# Patient Record
Sex: FEMALE | Race: WHITE | Employment: FULL TIME | URBAN - METROPOLITAN AREA
[De-identification: names, ages, dates, MRNs, and addresses within clinical notes are randomized per-mention and may not be internally consistent; named-entity substitution may affect disease eponyms.]

---

## 2017-01-11 ENCOUNTER — HOSPITAL ENCOUNTER (OUTPATIENT)
Dept: MAMMOGRAPHY | Age: 47
Discharge: HOME | End: 2017-01-11
Attending: ADVANCED PRACTICE MIDWIFE
Payer: COMMERCIAL

## 2017-01-11 DIAGNOSIS — Z12.31 VISIT FOR SCREENING MAMMOGRAM: ICD-10-CM

## 2017-01-11 PROCEDURE — 77067 SCR MAMMO BI INCL CAD: CPT

## 2017-09-18 ENCOUNTER — TELEPHONE (OUTPATIENT)
Dept: OBGYN CLINIC | Age: 47
End: 2017-09-18

## 2017-09-19 DIAGNOSIS — Z30.09 FAMILY PLANNING: Primary | ICD-10-CM

## 2017-09-19 RX ORDER — NORETHINDRONE ACETATE AND ETHINYL ESTRADIOL 1MG-20(21)
1 KIT ORAL DAILY
Qty: 28 TAB | Refills: 2 | Status: SHIPPED | OUTPATIENT
Start: 2017-09-19 | End: 2017-11-27 | Stop reason: ALTCHOICE

## 2017-09-26 NOTE — TELEPHONE ENCOUNTER
Pt called pharmacy said they didn't receive her rx. See that it is confirmed in connect care that they received it. Told pt that we would look into it and give her a call since she is 3 days late taking it.

## 2017-09-27 NOTE — TELEPHONE ENCOUNTER
Returned patients call, she was not available to answer. Left a voicemail message for her to return my call.

## 2017-11-27 ENCOUNTER — OFFICE VISIT (OUTPATIENT)
Dept: OBGYN CLINIC | Age: 47
End: 2017-11-27

## 2017-11-27 VITALS
SYSTOLIC BLOOD PRESSURE: 127 MMHG | HEIGHT: 65 IN | HEART RATE: 118 BPM | WEIGHT: 133 LBS | DIASTOLIC BLOOD PRESSURE: 76 MMHG | BODY MASS INDEX: 22.16 KG/M2

## 2017-11-27 DIAGNOSIS — Z32.02 URINE PREGNANCY TEST NEGATIVE: ICD-10-CM

## 2017-11-27 DIAGNOSIS — Z01.419 WELL WOMAN EXAM WITH ROUTINE GYNECOLOGICAL EXAM: Primary | ICD-10-CM

## 2017-11-27 DIAGNOSIS — Z30.09 FAMILY PLANNING: ICD-10-CM

## 2017-11-27 RX ORDER — ACETAMINOPHEN AND CODEINE PHOSPHATE 120; 12 MG/5ML; MG/5ML
1 SOLUTION ORAL DAILY
Qty: 1 PACKAGE | Refills: 11 | Status: SHIPPED | OUTPATIENT
Start: 2017-11-27 | End: 2018-01-12 | Stop reason: ALTCHOICE

## 2017-11-27 RX ORDER — NORTRIPTYLINE HYDROCHLORIDE 50 MG/1
100 CAPSULE ORAL
Refills: 0 | COMMUNITY
Start: 2017-10-31

## 2017-11-27 RX ORDER — OMEPRAZOLE 40 MG/1
CAPSULE, DELAYED RELEASE ORAL
Refills: 0 | COMMUNITY
Start: 2017-11-21

## 2017-11-27 RX ORDER — LEVOTHYROXINE SODIUM 75 UG/1
TABLET ORAL
Refills: 0 | COMMUNITY
Start: 2017-10-31

## 2017-11-27 NOTE — PROGRESS NOTES
Subjective:   41 y.o. female for Well Woman Check. Patient's last menstrual period was 11/21/2017. Social History: single partner, contraception - OCP (Oral Contraceptive Pills). Pertinent past medical hstory: migraines. There is no problem list on file for this patient. Current Outpatient Prescriptions   Medication Sig Dispense Refill    levothyroxine (SYNTHROID) 75 mcg tablet   0    nortriptyline (PAMELOR) 50 mg capsule 100 mg nightly. 0    omeprazole (PRILOSEC) 40 mg capsule   0    norethindrone (MICRONOR) 0.35 mg tab Take 1 Tab by mouth daily. 1 Package 11    topiramate (TOPAMAX) 100 mg tablet Take 200 mg by mouth nightly.  acyclovir (ZOVIRAX) 5 % ointment Apply  to affected area every three (3) hours. 2 g 1    nitrofurantoin, macrocrystal-monohydrate, (MACROBID) 100 mg capsule Take 1 Cap by mouth two (2) times a day. 20 Cap 5    zolpidem (AMBIEN) 10 mg tablet Take  by mouth nightly as needed. No Known Allergies  Past Medical History:   Diagnosis Date    Hx of migraines      History reviewed. No pertinent surgical history. Family History   Problem Relation Age of Onset    Breast Cancer Maternal Aunt 61    No Known Problems Mother     No Known Problems Father      Social History   Substance Use Topics    Smoking status: Never Smoker    Smokeless tobacco: Never Used    Alcohol use No        ROS:  Feeling well. No dyspnea or chest pain on exertion. No abdominal pain, change in bowel habits, black or bloody stools. No urinary tract symptoms. GYN ROS: normal menses, no abnormal bleeding, pelvic pain or discharge, no breast pain or new or enlarging lumps on self exam. No neurological complaints. Objective:     Visit Vitals    /76 (BP 1 Location: Left arm, BP Patient Position: Sitting)    Pulse (!) 118    Ht 5' 5\" (1.651 m)    Wt 133 lb (60.3 kg)    LMP 11/21/2017    BMI 22.13 kg/m2     The patient appears well, alert, oriented x 3, in no distress.   ENT normal. Neck supple. No adenopathy or thyromegaly. DAHIANA. Lungs are clear, good air entry, no wheezes, rhonchi or rales. S1 and S2 normal, no murmurs, regular rate and rhythm. Abdomen soft without tenderness, guarding, mass or organomegaly. Extremities show no edema, normal peripheral pulses. Neurological is normal, no focal findings. BREAST EXAM: breasts appear normal, no suspicious masses, no skin or nipple changes or axillary nodes    PELVIC EXAM: VULVA: normal appearing vulva with no masses, tenderness or lesions, VAGINA: normal appearing vagina with normal color and discharge, no lesions, CERVIX: normal appearing cervix without discharge or lesions, cervical motion tenderness absent, UTERUS: uterus is normal size, shape, consistency and nontender, ADNEXA: normal adnexa in size, nontender and no masses, PAP: Pap smear done today, thin-prep method, Pap smear schedule reviewed with patient, recent Pap result reviewed with patient, HPV test    Assessment/Plan:   well woman  mammogram  pap smear  counseled on breast self exam, mammography screening, STD prevention, HIV risk factors and prevention, use and side effects of OCP's, family planning choices, menopause and adequate intake of calcium and vitamin D  return annually or prn    ICD-10-CM ICD-9-CM    1. Well woman exam with routine gynecological exam Z01.419 V72.31 PAP (IMAGE GUIDED) + HPV HIGH RISK   2. Family planning Z30.09 V25.09 norethindrone (MICRONOR) 0.35 mg tab      AMB POC URINE PREGNANCY TEST, VISUAL COLOR COMPARISON   3.  Urine pregnancy test negative Z32.02 V72.41    .

## 2017-11-27 NOTE — MR AVS SNAPSHOT
Visit Information Date & Time Provider Department Dept. Phone Encounter #  
 11/27/2017  3:30 PM [facility] [place] [phone] [id number] Follow-up Instructions Return in about 1 year (around 11/27/2018), or if symptoms worsen or fail to improve. Upcoming Health Maintenance Date Due Influenza Age 5 to Adult 8/1/2017 PAP AKA CERVICAL CYTOLOGY 11/11/2019 Allergies as of 11/27/2017  Review Complete On: 11/27/2017 By: Suman Thomas CNM No Known Allergies Current Immunizations  Never Reviewed No immunizations on file. Not reviewed this visit You Were Diagnosed With   
  
 Codes Comments Well woman exam with routine gynecological exam    -  Primary ICD-10-CM: X20.099 ICD-9-CM: V72.31 Family planning     ICD-10-CM: Z30.09 
ICD-9-CM: V25.09 Vitals BP Pulse Height(growth percentile) Weight(growth percentile) LMP BMI  
 127/76 (BP 1 Location: Left arm, BP Patient Position: Sitting) (!) 118 5' 5\" (1.651 m) 133 lb (60.3 kg) 11/21/2017 22.13 kg/m2 OB Status Smoking Status Having regular periods Never Smoker BMI and BSA Data Body Mass Index Body Surface Area  
 22.13 kg/m 2 1.66 m 2 Preferred Pharmacy Pharmacy Name Phone [address], [address] [place] [address] [phone] Your Updated Medication List  
  
   
This list is accurate as of: 11/27/17  3:50 PM.  Always use your most recent med list.  
  
  
  
  
 acyclovir 5 % ointment Commonly known as:  ZOVIRAX Apply  to affected area every three (3) hours. AMBIEN 10 mg tablet Generic drug:  zolpidem Take  by mouth nightly as needed. levothyroxine 75 mcg tablet Commonly known as:  SYNTHROID  
  
 nitrofurantoin (macrocrystal-monohydrate) 100 mg capsule Commonly known as:  MACROBID Take 1 Cap by mouth two (2) times a day. norethindrone 0.35 mg Tab Commonly known as:  Maurilio & Maurilio Take 1 Tab by mouth daily. nortriptyline 50 mg capsule Commonly known as:  PAMELOR  
100 mg nightly. omeprazole 40 mg capsule Commonly known as:  PRILOSEC  
  
 TOPAMAX 100 mg tablet Generic drug:  topiramate Take 200 mg by mouth nightly. Prescriptions Sent to Pharmacy Refills  
 norethindrone (MICRONOR) 0.35 mg tab 11 Sig: Take 1 Tab by mouth daily. Class: Normal  
 Pharmacy: [facility], [address] [place] [address] #: [phone] Route: Oral  
  
Follow-up Instructions Return in about 1 year (around 11/27/2018), or if symptoms worsen or fail to improve. Patient Instructions Well Visit, Ages 25 to 48: Care Instructions Your Care Instructions Physical exams can help you stay healthy. Your doctor has checked your overall health and may have suggested ways to take good care of yourself. He or she also may have recommended tests. At home, you can help prevent illness with healthy eating, regular exercise, and other steps. Follow-up care is a key part of your treatment and safety. Be sure to make and go to all appointments, and call your doctor if you are having problems. It's also a good idea to know your test results and keep a list of the medicines you take. How can you care for yourself at home? · Reach and stay at a healthy weight. This will lower your risk for many problems, such as obesity, diabetes, heart disease, and high blood pressure. · Get at least 30 minutes of physical activity on most days of the week. Walking is a good choice. You also may want to do other activities, such as running, swimming, cycling, or playing tennis or team sports. Discuss any changes in your exercise program with your doctor. · Do not smoke or allow others to smoke around you. If you need help quitting, talk to your doctor about stop-smoking programs and medicines. These can increase your chances of quitting for good. · Talk to your doctor about whether you have any risk factors for sexually transmitted infections (STIs). Having one sex partner (who does not have STIs and does not have sex with anyone else) is a good way to avoid these infections. · Use birth control if you do not want to have children at this time. Talk with your doctor about the choices available and what might be best for you. · Protect your skin from too much sun. When you're outdoors from 10 a.m. to 4 p.m., stay in the shade or cover up with clothing and a hat with a wide brim. Wear sunglasses that block UV rays. Even when it's cloudy, put broad-spectrum sunscreen (SPF 30 or higher) on any exposed skin. · See a dentist one or two times a year for checkups and to have your teeth cleaned. · Wear a seat belt in the car. · Drink alcohol in moderation, if at all. That means no more than 2 drinks a day for men and 1 drink a day for women. Follow your doctor's advice about when to have certain tests. These tests can spot problems early. For everyone · Cholesterol. Have the fat (cholesterol) in your blood tested after age 21. Your doctor will tell you how often to have this done based on your age, family history, or other things that can increase your risk for heart disease. · Blood pressure. Have your blood pressure checked during a routine doctor visit. Your doctor will tell you how often to check your blood pressure based on your age, your blood pressure results, and other factors. · Vision. Talk with your doctor about how often to have a glaucoma test. 
· Diabetes. Ask your doctor whether you should have tests for diabetes. · Colon cancer. Have a test for colon cancer at age 48. You may have one of several tests. If you are younger than 48, you may need a test earlier if you have any risk factors.  Risk factors include whether you already had a precancerous polyp removed from your colon or whether your parent, brother, sister, or child has had colon cancer. For women · Breast exam and mammogram. Talk to your doctor about when you should have a clinical breast exam and a mammogram. Medical experts differ on whether and how often women under 50 should have these tests. Your doctor can help you decide what is right for you. · Pap test and pelvic exam. Begin Pap tests at age 24. A Pap test is the best way to find cervical cancer. The test often is part of a pelvic exam. Ask how often to have this test. 
· Tests for sexually transmitted infections (STIs). Ask whether you should have tests for STIs. You may be at risk if you have sex with more than one person, especially if your partners do not wear condoms. For men · Tests for sexually transmitted infections (STIs). Ask whether you should have tests for STIs. You may be at risk if you have sex with more than one person, especially if you do not wear a condom. · Testicular cancer exam. Ask your doctor whether you should check your testicles regularly. · Prostate exam. Talk to your doctor about whether you should have a blood test (called a PSA test) for prostate cancer. Experts differ on whether and when men should have this test. Some experts suggest it if you are older than 39 and are -American or have a father or brother who got prostate cancer when he was younger than 72. When should you call for help? Watch closely for changes in your health, and be sure to contact your doctor if you have any problems or symptoms that concern you. Where can you learn more? Go to http://chandler-consuelo.info/. Enter P072 in the search box to learn more about \"Well Visit, Ages 25 to 48: Care Instructions. \" Current as of: May 12, 2017 Content Version: 11.4 © 5099-5782 Healthwise, Incorporated.  Care instructions adapted under license by [address] (which disclaims liability or warranty for this information). If you have questions about a medical condition or this instruction, always ask your healthcare professional. [address] any warranty or liability for your use of this information. Introducing [facility]! [employer] introduces [employer] patient portal. Now you can access parts of your medical record, email your doctor's office, and request medication refills online. 1. In your internet browser, go to [email or web address] 2. Click on the First Time User? Click Here link in the Sign In box. You will see the New Member Sign Up page. 3. Enter your [employer] Access Code exactly as it appears below. You will not need to use this code after youve completed the sign-up process. If you do not sign up before the expiration date, you must request a new code. · [employer] Access Code: [email or web address] Expires: 2/25/2018  3:50 PM 
 
4. Enter the last four digits of your Social Security Number (xxxx) and Date of Birth (mm/dd/yyyy) as indicated and click Submit. You will be taken to the next sign-up page. 5. Create a [employer] ID. This will be your [employer] login ID and cannot be changed, so think of one that is secure and easy to remember. 6. Create a [employer] password. You can change your password at any time. 7. Enter your Password Reset Question and Answer. This can be used at a later time if you forget your password. 8. Enter your e-mail address. You will receive e-mail notification when new information is available in [address] 9. Click Sign Up. You can now view and download portions of your medical record. 10. Click the Download Summary menu link to download a portable copy of your medical information. If you have questions, please visit the Frequently Asked Questions section of the [employer] website.  Remember, [employer] is NOT to be used for urgent needs. For medical emergencies, dial 911. Now available from your iPhone and Android! Please provide this summary of care documentation to your next provider. Your primary care clinician is listed as Angle Bermudez. If you have any questions after today's visit, please call [phone].

## 2017-11-27 NOTE — PATIENT INSTRUCTIONS

## 2017-11-29 LAB
HPV HIGH RISK, 507303: POSITIVE
PAP IMAGE GUIDED, 8900296: ABNORMAL

## 2017-12-12 LAB
HPV GENOTYPE, 16, 507501: POSITIVE
HPV GENOTYPE, 18, 507502: NEGATIVE

## 2017-12-21 ENCOUNTER — TELEPHONE (OUTPATIENT)
Dept: OBGYN CLINIC | Age: 47
End: 2017-12-21

## 2017-12-21 DIAGNOSIS — B37.31 VAGINAL YEAST INFECTION: Primary | ICD-10-CM

## 2017-12-21 RX ORDER — FLUCONAZOLE 150 MG/1
150 TABLET ORAL DAILY
Qty: 1 TAB | Refills: 0 | Status: SHIPPED | OUTPATIENT
Start: 2017-12-21 | End: 2017-12-22

## 2017-12-21 NOTE — TELEPHONE ENCOUNTER
Spoke with patient using appropriate patient identifiers. Reviewed HPV + results and recommendation for colpo. She verbalizes understanding. Also requests rx for Diflucan after finishing Amoxicillin 500mg for 1 week. Currently has vaginal itching. Will escribe medication to pharmacy and transferred to scheduling for colpo appointment.

## 2018-01-12 ENCOUNTER — OFFICE VISIT (OUTPATIENT)
Dept: OBGYN CLINIC | Age: 48
End: 2018-01-12

## 2018-01-12 VITALS
WEIGHT: 136 LBS | HEART RATE: 90 BPM | DIASTOLIC BLOOD PRESSURE: 71 MMHG | HEIGHT: 65 IN | RESPIRATION RATE: 16 BRPM | OXYGEN SATURATION: 99 % | SYSTOLIC BLOOD PRESSURE: 111 MMHG | TEMPERATURE: 97.9 F | BODY MASS INDEX: 22.66 KG/M2

## 2018-01-12 DIAGNOSIS — Z76.89 VISIT FOR BIOPSY: ICD-10-CM

## 2018-01-12 DIAGNOSIS — R87.810 ASCUS WITH POSITIVE HIGH RISK HPV CERVICAL: Primary | ICD-10-CM

## 2018-01-12 DIAGNOSIS — N92.1 BREAKTHROUGH BLEEDING ON BIRTH CONTROL PILLS: ICD-10-CM

## 2018-01-12 DIAGNOSIS — R87.610 ASCUS WITH POSITIVE HIGH RISK HPV CERVICAL: Primary | ICD-10-CM

## 2018-01-12 LAB
HCG URINE, QL. (POC): NEGATIVE
VALID INTERNAL CONTROL?: YES

## 2018-01-12 NOTE — PROGRESS NOTES
SUBJ: Ms. Nick Don is a 57 y.o. y/o female, , who presents today for     Chief Complaint   Patient presents with    Colposcopy    Menstrual Problem       Her LMP was Patient's last menstrual period was 2018. Violette presents for:    1. Colpo - ASCUS with HPV 16 on . Counseled on need for follow up colpo. Previous Pap was also ASCUS with +HR HPV, colpo report was normal.     2. Irregular bleeding - Patient was placed on POP at last office visit after long standing hx on RIK. Since taking POP, she experiences irregular bleeding. Now has 2-3 menstrual cycles per month. Bleeding now lasts longer than her normal 3 days - lasts 4-5 days. Patient notes bleeding is heavier than previously. Denies dysmenorrhea. No other  concerns, denies SOB, H/A or CP. Desires to change back to combined oral contraceptive. Past Medical History:   Diagnosis Date    Hx of migraines       History reviewed. No pertinent surgical history. Family History   Problem Relation Age of Onset    Breast Cancer Maternal Aunt 61    No Known Problems Mother     No Known Problems Father      Social History     Social History    Marital status:      Spouse name: N/A    Number of children: N/A    Years of education: N/A     Social History Main Topics    Smoking status: Never Smoker    Smokeless tobacco: Never Used    Alcohol use No    Drug use: No    Sexual activity: Yes     Partners: Male     Other Topics Concern    None     Social History Narrative         PE:   Visit Vitals    /71    Pulse 90    Temp 97.9 °F (36.6 °C) (Oral)    Resp 16    Ht 5' 5\" (1.651 m)    Wt 136 lb (61.7 kg)    LMP 2018    SpO2 99%    BMI 22.63 kg/m2       Pt is a well developed female who is alert and oriented x 3. CVS exam: normal rate, regular rhythm, normal S1, S2, no murmurs, rubs, clicks or gallops.   Lungs: clear bilaterally to auscultation, no wheezing, rhonci or rales    Colposcopy Procedure Note    Patient: Raymond Hurley  MRN: [id number]  Date: 1/12/2018     Age:  57 y.o.,      Sex: female    YOB: 1970    Raymond Hurley is seen today for colposcopy. Indications:   Most recent Pap smear dated 11/27/17. Result: ASCUS HR HPV+. The prior Pap result: ASCUS HR HPV+  Prior cervical/vaginal disease: None. Prior cervical treatment: No treatment. Procedure Details   The risks and benefits of the procedure and written informed consent obtained. Speculum placed in vagina and excellent visualization of cervix achieved, cervix swabbed x 3 with acetic acid solution. Findings:  Cervix: mosaicism noted at 4, 12, and 8 o'clock; endocervical curettage performed, cervical biopsies taken at 4, 12, and 8 o'clock, specimen labelled and sent to pathology and hemostasis achieved with Monsel's solution. Vaginal inspection: vaginal colposcopy not performed. Vulvar colposcopy: vulvar colposcopy not performed. Specimens: Ectocervix at 4, 12, and 8 o'clock, ECC  Complications: none. Kala Sheridan CNM  January 12, 2018      Assessment/Plan:     1. Visit for biopsy  Urine pregnancy test negative  - AMB POC URINE PREGNANCY TEST, VISUAL COLOR COMPARISON    2. Breakthrough bleeding on birth control pills  Reviewed R/B/A of RIK and risk of cardiovascular or thrombotic event due   Reviewed s/s of CVA or MI and when to d/c RIK and visit ED  She verbalizes understanding and acknowledges risk  Desires to resume RIK despite risk  - norethindrone-e.estradiol-iron (LO LOESTRIN FE) 1 mg-10 mcg (24)/10 mcg (2) tab; Take 1 Tab by mouth daily. Dispense: 1 Package; Refill: 10    3. ASCUS with positive high risk HPV cervical  Specimens labelled and sent to Pathology. Will base further treatment on Pathology findings. Post biopsy instructions given to patient.   - SURGICAL PATHOLOGY; Future  RTC for 2 weeks for f/u

## 2018-01-12 NOTE — PATIENT INSTRUCTIONS
Colposcopy: What to Expect at [address] may feel some soreness in your vagina for a day or two if you had a biopsy. Some vaginal bleeding or discharge is normal for up to a week after a biopsy. The discharge may be dark-colored if a solution was put on your cervix. You can use a sanitary pad for the bleeding. It may take a week or two for you to get the test results. This care sheet gives you a general idea about how long it will take for you to recover. But each person recovers at a different pace. Follow the steps below to feel better as quickly as possible. How can you care for yourself at home? Activity  ? · You can return to work and most daily activities right after the test.   Exercise  ? · Do not exercise for 1 day after the test.   Medicines  ? · Your doctor will tell you if and when you can restart your medicines. He or she will also give you instructions about taking any new medicines. ? · If you take blood thinners, such as warfarin (Coumadin), clopidogrel (Plavix), or aspirin, be sure to talk to your doctor. He or she will tell you if and when to start taking those medicines again. Make sure that you understand exactly what your doctor wants you to do. ? · Take an over-the-counter pain medicine, such as acetaminophen (Tylenol), ibuprofen (Advil, Motrin), or naproxen (Aleve). Be safe with medicines. Read and follow all instructions on the label. Do not take two or more pain medicines at the same time unless the doctor told you to. Many pain medicines have acetaminophen, which is Tylenol. Too much acetaminophen (Tylenol) can be harmful. Other instructions  ? · Use a pad if you have some bleeding. ? · Do not douche, have sexual intercourse, or use tampons for 1 week if you had a biopsy. This will allow time for your cervix to heal.   ? · You can take a bath or shower anytime after the test.   Follow-up care is a key part of your treatment and safety.  Be sure to make and go Spoke with pt , test results given   to all appointments, and call your doctor if you are having problems. It's also a good idea to know your test results and keep a list of the medicines you take. When should you call for help? Call your doctor now or seek immediate medical care if:  ? · You have severe vaginal bleeding. This means that you are soaking through your usual pads or tampons each hour for 2 or more hours. ? · You have pain that does not get better after you take pain medicine. ? · You have signs of infection, such as:  ¨ Increased pain. ¨ Bad-smelling vaginal discharge. ¨ A fever. ? Watch closely for any changes in your health, and be sure to contact your doctor if:  ? · You have questions or concerns. Where can you learn more? Go to http://chandler-consuelo.info/. Enter M523 in the search box to learn more about \"Colposcopy: What to Expect at Home. \"  Current as of: May 12, 2017  Content Version: 11.4  © 5801-3533 Healthwise, Incorporated. Care instructions adapted under license by [employer] (which disclaims liability or warranty for this information). If you have questions about a medical condition or this instruction, always ask your healthcare professional. [address] any warranty or liability for your use of this information.

## 2018-01-12 NOTE — MR AVS SNAPSHOT
Visit Information Date & Time Provider Department Dept. Phone Encounter #  
 1/12/2018 11:00 AM [address], [address] [place] [address] [id number] Upcoming Health Maintenance Date Due Influenza Age 5 to Adult 8/1/2017 PAP AKA CERVICAL CYTOLOGY 11/27/2020 Allergies as of 1/12/2018  Review Complete On: 1/12/2018 By: [address], CNM No Known Allergies Current Immunizations  Never Reviewed No immunizations on file. Not reviewed this visit You Were Diagnosed With   
  
 Codes Comments ASCUS with positive high risk HPV cervical    -  Primary ICD-10-CM: R87.610, R87.810 ICD-9-CM: 795.01, 795.05 Visit for biopsy     ICD-10-CM: Z01.818 ICD-9-CM: V72.83 Breakthrough bleeding on birth control pills     ICD-10-CM: N92.1 ICD-9-CM: 626.6 Vitals BP Pulse Temp Resp Height(growth percentile) Weight(growth percentile) 111/71 90 97.9 °F (36.6 °C) (Oral) 16 5' 5\" (1.651 m) 136 lb (61.7 kg) SpO2 BMI OB Status Smoking Status 99% 22.63 kg/m2 Having regular periods Never Smoker BMI and BSA Data Body Mass Index Body Surface Area  
 22.63 kg/m 2 1.68 m 2 Preferred Pharmacy Pharmacy Name Phone [address], [address] [place] [address] [phone] Your Updated Medication List  
  
   
This list is accurate as of: 1/12/18 11:50 AM.  Always use your most recent med list.  
  
  
  
  
 acyclovir 5 % ointment Commonly known as:  ZOVIRAX Apply  to affected area every three (3) hours. AMBIEN 10 mg tablet Generic drug:  zolpidem Take  by mouth nightly as needed. levothyroxine 75 mcg tablet Commonly known as:  SYNTHROID  
  
 nitrofurantoin (macrocrystal-monohydrate) 100 mg capsule Commonly known as:  MACROBID Take 1 Cap by mouth two (2) times a day. norethindrone-e.estradiol-iron 1 mg-10 mcg (24)/10 mcg (2) Tab Commonly known as:  LO LOESTRIN FE Take 1 Tab by mouth daily. nortriptyline 50 mg capsule Commonly known as:  PAMELOR  
100 mg nightly. omeprazole 40 mg capsule Commonly known as:  PRILOSEC  
  
 TOPAMAX 100 mg tablet Generic drug:  topiramate Take 200 mg by mouth nightly. Prescriptions Sent to Pharmacy Refills  
 norethindrone-e.estradiol-iron (LO LOESTRIN FE) 1 mg-10 mcg (24)/10 mcg (2) tab 10 Sig: Take 1 Tab by mouth daily. Class: Normal  
 Pharmacy: [facility], [address] [place] [address] #: [phone] Route: Oral  
  
We Performed the Following AMB POC URINE PREGNANCY TEST, VISUAL COLOR COMPARISON [20093 CPT(R)] To-Do List   
 01/15/2018 10:00 AM  
  Appointment with HALLIE NIEVES 1 at [address] [place] [address] ([phone]) OUTSIDE FILMS  - Any outside films related to the study being scheduled should be brought with you on the day of the exam.  If this cannot be done there may be a delay in the reading of the study. MEDICATIONS  - Patient must bring a complete list of all medications currently taking to include prescriptions, over-the-counter meds, herbals, vitamins & any dietary supplements  GENERAL INSTRUCTIONS  - On the day of your exam do not use any bath powder, deodorant or lotions on the armpit area. -Tenderness of breasts may cause an increase of discomfort during procedure. If you are experiencing breast tenderness on the day of your appointment and would like to reschedule, please call [phone]. Patient Instructions Colposcopy: What to Expect at [facility] Your Recovery You may feel some soreness in your vagina for a day or two if you had a biopsy. Some vaginal bleeding or discharge is normal for up to a week after a biopsy. The discharge may be dark-colored if a solution was put on your cervix. You can use a sanitary pad for the bleeding. It may take a week or two for you to get the test results. This care sheet gives you a general idea about how long it will take for you to recover. But each person recovers at a different pace. Follow the steps below to feel better as quickly as possible. How can you care for yourself at home? Activity ? · You can return to work and most daily activities right after the test.  
Exercise ? · Do not exercise for 1 day after the test.  
Medicines ? · Your doctor will tell you if and when you can restart your medicines. He or she will also give you instructions about taking any new medicines. ? · If you take blood thinners, such as warfarin (Coumadin), clopidogrel (Plavix), or aspirin, be sure to talk to your doctor. He or she will tell you if and when to start taking those medicines again. Make sure that you understand exactly what your doctor wants you to do. ? · Take an over-the-counter pain medicine, such as acetaminophen (Tylenol), ibuprofen (Advil, Motrin), or naproxen (Aleve). Be safe with medicines. Read and follow all instructions on the label. Do not take two or more pain medicines at the same time unless the doctor told you to. Many pain medicines have acetaminophen, which is Tylenol. Too much acetaminophen (Tylenol) can be harmful. Other instructions ? · Use a pad if you have some bleeding. ? · Do not douche, have sexual intercourse, or use tampons for 1 week if you had a biopsy. This will allow time for your cervix to heal.  
? · You can take a bath or shower anytime after the test.  
Follow-up care is a key part of your treatment and safety. Be sure to make and go to all appointments, and call your doctor if you are having problems. It's also a good idea to know your test results and keep a list of the medicines you take. When should you call for help? Call your doctor now or seek immediate medical care if: 
? · You have severe vaginal bleeding.  This means that you are soaking through your usual pads or tampons each hour for 2 or more hours. ? · You have pain that does not get better after you take pain medicine. ? · You have signs of infection, such as: 
¨ Increased pain. ¨ Bad-smelling vaginal discharge. ¨ A fever. ? Watch closely for any changes in your health, and be sure to contact your doctor if: 
? · You have questions or concerns. Where can you learn more? Go to http://chandler-consuelo.info/. Enter M523 in the search box to learn more about \"Colposcopy: What to Expect at Home. \" Current as of: May 12, 2017 Content Version: 11.4 © 6667-6933 [employer]. Care instructions adapted under license by [employer] (which disclaims liability or warranty for this information). If you have questions about a medical condition or this instruction, always ask your healthcare professional. [place] [address] any warranty or liability for your use of this information. Introducing [facility]! [employer] introduces Hit the Mark patient portal. Now you can access parts of your medical record, email your doctor's office, and request medication refills online. 1. In your internet browser, go to [email or web address] 2. Click on the First Time User? Click Here link in the Sign In box. You will see the New Member Sign Up page. 3. Enter your Hit the Mark Access Code exactly as it appears below. You will not need to use this code after youve completed the sign-up process. If you do not sign up before the expiration date, you must request a new code. · Hit the Mark Access Code: [email or web address] Expires: 2/25/2018  3:50 PM 
 
4. Enter the last four digits of your Social Security Number (xxxx) and Date of Birth (mm/dd/yyyy) as indicated and click Submit. You will be taken to the next sign-up page. 5. Create a Hit the Mark ID.  This will be your Hit the Mark login ID and cannot be changed, so think of one that is secure and easy to remember. 6. Create a Camero password. You can change your password at any time. 7. Enter your Password Reset Question and Answer. This can be used at a later time if you forget your password. 8. Enter your e-mail address. You will receive e-mail notification when new information is available in [address] 9. Click Sign Up. You can now view and download portions of your medical record. 10. Click the Download Summary menu link to download a portable copy of your medical information. If you have questions, please visit the Frequently Asked Questions section of the Camero website. Remember, Camero is NOT to be used for urgent needs. For medical emergencies, dial 911. Now available from your iPhone and Android! Please provide this summary of care documentation to your next provider. Your primary care clinician is listed as Sylvia Blackmon. If you have any questions after today's visit, please call [phone].

## 2018-01-15 ENCOUNTER — HOSPITAL ENCOUNTER (OUTPATIENT)
Dept: MAMMOGRAPHY | Age: 48
Discharge: HOME | End: 2018-01-15
Attending: ADVANCED PRACTICE MIDWIFE
Payer: COMMERCIAL

## 2018-01-15 DIAGNOSIS — Z12.31 VISIT FOR SCREENING MAMMOGRAM: ICD-10-CM

## 2018-01-15 LAB — SPECIMEN FOR PATHOLOGY - OTHER, 18585: NORMAL

## 2018-01-15 PROCEDURE — 77067 SCR MAMMO BI INCL CAD: CPT

## 2018-11-01 ENCOUNTER — TELEPHONE (OUTPATIENT)
Dept: OBGYN CLINIC | Age: 48
End: 2018-11-01

## 2018-11-01 DIAGNOSIS — N92.1 BREAKTHROUGH BLEEDING ON BIRTH CONTROL PILLS: ICD-10-CM

## 2018-11-06 ENCOUNTER — TELEPHONE (OUTPATIENT)
Dept: OBGYN CLINIC | Age: 48
End: 2018-11-06

## 2018-12-07 ENCOUNTER — OFFICE VISIT (OUTPATIENT)
Dept: OBGYN CLINIC | Age: 48
End: 2018-12-07

## 2018-12-07 VITALS
HEIGHT: 65 IN | WEIGHT: 145.2 LBS | RESPIRATION RATE: 18 BRPM | DIASTOLIC BLOOD PRESSURE: 76 MMHG | OXYGEN SATURATION: 99 % | TEMPERATURE: 99.2 F | SYSTOLIC BLOOD PRESSURE: 120 MMHG | BODY MASS INDEX: 24.19 KG/M2 | HEART RATE: 105 BPM

## 2018-12-07 DIAGNOSIS — Z01.419 WELL WOMAN EXAM WITH ROUTINE GYNECOLOGICAL EXAM: Primary | ICD-10-CM

## 2018-12-07 DIAGNOSIS — Z01.419 WELL WOMAN EXAM WITH ROUTINE GYNECOLOGICAL EXAM: ICD-10-CM

## 2018-12-07 DIAGNOSIS — R30.0 DIFFICULT OR PAINFUL URINATION: ICD-10-CM

## 2018-12-07 LAB
BILIRUB UR QL STRIP: NEGATIVE
GLUCOSE UR-MCNC: NEGATIVE MG/DL
KETONES P FAST UR STRIP-MCNC: NEGATIVE MG/DL
PH UR STRIP: 7 [PH] (ref 4.6–8)
PROT UR QL STRIP: NEGATIVE
SP GR UR STRIP: 1.02 (ref 1–1.03)
UA UROBILINOGEN AMB POC: NORMAL (ref 0.2–1)
URINALYSIS CLARITY POC: NORMAL
URINALYSIS COLOR POC: NORMAL
URINE BLOOD POC: NORMAL
URINE LEUKOCYTES POC: NORMAL
URINE NITRITES POC: POSITIVE

## 2018-12-07 RX ORDER — NITROFURANTOIN 25; 75 MG/1; MG/1
100 CAPSULE ORAL 2 TIMES DAILY
Qty: 20 CAP | Refills: 5 | Status: SHIPPED | OUTPATIENT
Start: 2018-12-07

## 2018-12-09 LAB
HPV GENOTYPE, 16, 507501: POSITIVE
HPV GENOTYPE, 18, 507502: NEGATIVE
HPV HIGH RISK, 507303: POSITIVE
PAP IMAGE GUIDED, 8900296: ABNORMAL
RESULT: ABNORMAL

## 2018-12-09 NOTE — PATIENT INSTRUCTIONS
Painful Urination (Dysuria): Care Instructions Your Care Instructions Burning pain with urination (dysuria) is a common symptom of a urinary tract infection or other urinary problems. The bladder may become inflamed. This can cause pain when the bladder fills and empties. You may also feel pain if the tube that carries urine from the bladder to the outside of the body (urethra) gets irritated or infected. Sexually transmitted infections (STIs) also may cause pain when you urinate. Sometimes the pain can be caused by things other than an infection. The urethra can be irritated by soaps, perfumes, or foreign objects in the urethra. Kidney stones can cause pain when they pass through the urethra. The cause may be hard to find. You may need tests. Treatment for painful urination depends on the cause. Follow-up care is a key part of your treatment and safety. Be sure to make and go to all appointments, and call your doctor if you are having problems. It's also a good idea to know your test results and keep a list of the medicines you take. How can you care for yourself at home? · Drink extra water for the next day or two. This will help make the urine less concentrated. (If you have kidney, heart, or liver disease and have to limit fluids, talk with your doctor before you increase the amount of fluids you drink.) · Avoid drinks that are carbonated or have caffeine. They can irritate the bladder. · Urinate often. Try to empty your bladder each time. For women: · Urinate right after you have sex. · After going to the bathroom, wipe from front to back. · Avoid douches, bubble baths, and feminine hygiene sprays. And avoid other feminine hygiene products that have deodorants. When should you call for help? Call your doctor now or seek immediate medical care if: 
  · You have new symptoms, such as fever, nausea, or vomiting.  
  · You have new or worse symptoms of a urinary problem. For example: ? You have blood or pus in your urine. ? You have chills or body aches. ? It hurts worse to urinate. ? You have groin or belly pain. ? You have pain in your back just below your rib cage (the flank area).  
 Watch closely for changes in your health, and be sure to contact your doctor if you have any problems. Where can you learn more? Go to http://chandler-consuelo.info/. Enter E431 in the search box to learn more about \"Painful Urination (Dysuria): Care Instructions. \" Current as of: March 21, 2018 Content Version: 11.8 © 2247-0892 [employer]. Care instructions adapted under license by [employer] (which disclaims liability or warranty for this information). If you have questions about a medical condition or this instruction, always ask your healthcare professional. [place] [address] any warranty or liability for your use of this information. Well Visit, Ages 25 to 48: Care Instructions Your Care Instructions Physical exams can help you stay healthy. Your doctor has checked your overall health and may have suggested ways to take good care of yourself. He or she also may have recommended tests. At home, you can help prevent illness with healthy eating, regular exercise, and other steps. Follow-up care is a key part of your treatment and safety. Be sure to make and go to all appointments, and call your doctor if you are having problems. It's also a good idea to know your test results and keep a list of the medicines you take. How can you care for yourself at home? · Reach and stay at a healthy weight. This will lower your risk for many problems, such as obesity, diabetes, heart disease, and high blood pressure. · Get at least 30 minutes of physical activity on most days of the week. Walking is a good choice. You also may want to do other activities, such as running, swimming, cycling, or playing tennis or team sports.  Discuss any changes in your exercise program with your doctor. · Do not smoke or allow others to smoke around you. If you need help quitting, talk to your doctor about stop-smoking programs and medicines. These can increase your chances of quitting for good. · Talk to your doctor about whether you have any risk factors for sexually transmitted infections (STIs). Having one sex partner (who does not have STIs and does not have sex with anyone else) is a good way to avoid these infections. · Use birth control if you do not want to have children at this time. Talk with your doctor about the choices available and what might be best for you. · Protect your skin from too much sun. When you're outdoors from 10 a.m. to 4 p.m., stay in the shade or cover up with clothing and a hat with a wide brim. Wear sunglasses that block UV rays. Even when it's cloudy, put broad-spectrum sunscreen (SPF 30 or higher) on any exposed skin. · See a dentist one or two times a year for checkups and to have your teeth cleaned. · Wear a seat belt in the car. · Drink alcohol in moderation, if at all. That means no more than 2 drinks a day for men and 1 drink a day for women. Follow your doctor's advice about when to have certain tests. These tests can spot problems early. For everyone · Cholesterol. Have the fat (cholesterol) in your blood tested after age 21. Your doctor will tell you how often to have this done based on your age, family history, or other things that can increase your risk for heart disease. · Blood pressure. Have your blood pressure checked during a routine doctor visit. Your doctor will tell you how often to check your blood pressure based on your age, your blood pressure results, and other factors. · Vision. Talk with your doctor about how often to have a glaucoma test. 
· Diabetes. Ask your doctor whether you should have tests for diabetes. · Colon cancer. Have a test for colon cancer at age 48.  You may have one of several tests. If you are younger than 48, you may need a test earlier if you have any risk factors. Risk factors include whether you already had a precancerous polyp removed from your colon or whether your parent, brother, sister, or child has had colon cancer. For women · Breast exam and mammogram. Talk to your doctor about when you should have a clinical breast exam and a mammogram. Medical experts differ on whether and how often women under 50 should have these tests. Your doctor can help you decide what is right for you. · Pap test and pelvic exam. Begin Pap tests at age 24. A Pap test is the best way to find cervical cancer. The test often is part of a pelvic exam. Ask how often to have this test. 
· Tests for sexually transmitted infections (STIs). Ask whether you should have tests for STIs. You may be at risk if you have sex with more than one person, especially if your partners do not wear condoms. For men · Tests for sexually transmitted infections (STIs). Ask whether you should have tests for STIs. You may be at risk if you have sex with more than one person, especially if you do not wear a condom. · Testicular cancer exam. Ask your doctor whether you should check your testicles regularly. · Prostate exam. Talk to your doctor about whether you should have a blood test (called a PSA test) for prostate cancer. Experts differ on whether and when men should have this test. Some experts suggest it if you are older than 39 and are -American or have a father or brother who got prostate cancer when he was younger than 72. When should you call for help? Watch closely for changes in your health, and be sure to contact your doctor if you have any problems or symptoms that concern you. Where can you learn more? Go to http://chandler-consuelo.info/. Enter P072 in the search box to learn more about \"Well Visit, Ages 25 to 48: Care Instructions. \" Current as of: March 29, 2018 Content Version: 11.8 © 0743-7798 [employer]. Care instructions adapted under license by [employer] (which disclaims liability or warranty for this information). If you have questions about a medical condition or this instruction, always ask your healthcare professional. [address] any warranty or liability for your use of this information.

## 2018-12-09 NOTE — PROGRESS NOTES
Subjective:  
57 y.o. female for Well Woman Check. Patient's last menstrual period was 11/21/2018. Social History: single partner, contraception - OCP (Oral Contraceptive Pills). Pertinent past medical hstory: no history of HTN, DVT, CAD, DM, liver disease, migraines or smoking. She admits to complaints of UTI symptoms, frequency and dysuria. She admits she takes Macrobid which usually helps. ROS:  Feeling well. No dyspnea or chest pain on exertion. No abdominal pain, change in bowel habits, black or bloody stools. No urinary tract symptoms. GYN ROS: normal menses, no abnormal bleeding, pelvic pain or discharge, no breast pain or new or enlarging lumps on self exam. No neurological complaints. Objective:  
 
Visit Vitals /76 (BP 1 Location: Left arm, BP Patient Position: Sitting) Pulse (!) 105 Temp 99.2 °F (37.3 °C) (Oral) Resp 18 Ht 5' 5\" (1.651 m) Wt 145 lb 3.2 oz (65.9 kg) LMP 11/21/2018 Comment: hx of oligomenorrhea. SpO2 99% BMI 24.16 kg/m² The patient appears well, alert, oriented x 3, in no distress. ENT normal.  Neck supple. No adenopathy or thyromegaly. DAHIANA. Lungs are clear, good air entry, no wheezes, rhonchi or rales. S1 and S2 normal, no murmurs, regular rate and rhythm. Abdomen soft without tenderness, guarding, mass or organomegaly. Extremities show no edema, normal peripheral pulses. Neurological is normal, no focal findings. BREAST EXAM: breasts appear normal, no suspicious masses, no skin or nipple changes or axillary nodes. PELVIC EXAM: normal external genitalia, vulva, vagina, cervix, uterus and adnexa Assessment/Plan:  
well woman. pap smear 
return annually or prn 
  ICD-10-CM ICD-9-CM 1. Well woman exam with routine gynecological exam Z01.419 V72.31 PAP IG, APTIMA HPV AND RFX 16/18,45 ([id number]) PAP IG, APTIMA HPV AND RFX 16/18,45 ([id number]) 2. Difficult or painful urination R30.0 788.1 AMB POC URINALYSIS DIP STICK MANUAL W/O MICRO nitrofurantoin, macrocrystal-monohydrate, (MACROBID) 100 mg capsule CULTURE, URINE  
   CULTURE, URINE Mickeal Rink

## 2018-12-10 NOTE — PROGRESS NOTES
Urine test was positive for E coli. However she was given a Rx for [employer] Will repeat U/A and culture in 2 weeks.

## 2019-01-05 NOTE — PROGRESS NOTES
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 Will ask her to have colposcopy.  
(she has had persistent LGSIL but has not gotten worse)

## 2019-01-09 NOTE — PROGRESS NOTES
Call made to patient using two identifiers name and . Informed patient of lab results and the need for Colpo. Patient verbalized understanding and no further questions were asked.

## 2019-02-12 ENCOUNTER — OFFICE VISIT (OUTPATIENT)
Dept: OBGYN CLINIC | Age: 49
End: 2019-02-12

## 2019-02-12 VITALS
HEIGHT: 65 IN | BODY MASS INDEX: 24.39 KG/M2 | DIASTOLIC BLOOD PRESSURE: 77 MMHG | HEART RATE: 106 BPM | OXYGEN SATURATION: 98 % | RESPIRATION RATE: 16 BRPM | WEIGHT: 146.4 LBS | TEMPERATURE: 98.5 F | SYSTOLIC BLOOD PRESSURE: 114 MMHG

## 2019-02-12 DIAGNOSIS — Z12.31 VISIT FOR SCREENING MAMMOGRAM: ICD-10-CM

## 2019-02-12 DIAGNOSIS — R87.612 LOW GRADE SQUAMOUS INTRAEPITHELIAL LESION (LGSIL) ON CERVICAL PAP SMEAR: Primary | ICD-10-CM

## 2019-02-12 LAB
HCG URINE, QL. (POC): NEGATIVE
VALID INTERNAL CONTROL?: YES

## 2019-02-13 NOTE — PROGRESS NOTES
Colposcopic Examination Patient: Danyel Gomez  MRN: [id number]  Date: 2019 Age:  54 y.o.,      Sex: female    YOB: 1970 Danyel Gomez is  female whose LNMP is unknown. She is here for COLPOSCOPY. on 18 her pap smear was ASCUS. She had colposcopy on 18--showed LGSIL. She is here for another colposcopy. seen today for colposcopy. She also wants a mammogram.  
 
Colposcopy--Ectocervix is essentially normal without any abnormal lesions seen. No squamocolumnar junction seen. ECC done. Reason for exam: Abnormal Pap smear--LGSIL Impression: Cervical Dysplasia. Disposition: Will plan a surgical procedure (ie Cryosurgery or LEEP) if the result is worse than LGSIL, but if it is still LGSIL, will continue to check yearly Vida Glasgow MD 
2019

## 2019-02-14 DIAGNOSIS — B37.9 YEAST INFECTION: Primary | ICD-10-CM

## 2019-02-14 LAB — SPECIMEN FOR PATHOLOGY - OTHER, 18585: NORMAL

## 2019-02-14 RX ORDER — FLUCONAZOLE 150 MG/1
150 TABLET ORAL DAILY
Qty: 1 TAB | Refills: 0 | Status: SHIPPED | OUTPATIENT
Start: 2019-02-14 | End: 2019-02-15

## 2019-08-16 ENCOUNTER — HOSPITAL ENCOUNTER (OUTPATIENT)
Dept: MAMMOGRAPHY | Age: 49
Discharge: HOME | End: 2019-08-16
Attending: PSYCHIATRY & NEUROLOGY
Payer: COMMERCIAL

## 2019-08-16 DIAGNOSIS — Z12.31 VISIT FOR SCREENING MAMMOGRAM: ICD-10-CM

## 2019-08-16 PROCEDURE — 77063 BREAST TOMOSYNTHESIS BI: CPT

## 2019-09-16 RX ORDER — ACETAMINOPHEN AND CODEINE PHOSPHATE 120; 12 MG/5ML; MG/5ML
SOLUTION ORAL
Qty: 28 TAB | Refills: 10 | Status: SHIPPED | OUTPATIENT
Start: 2019-09-16

## 2020-10-21 ENCOUNTER — TRANSCRIBE ORDER (OUTPATIENT)
Dept: SCHEDULING | Age: 50
End: 2020-10-21

## 2020-10-21 DIAGNOSIS — Z12.31 VISIT FOR SCREENING MAMMOGRAM: Primary | ICD-10-CM

## 2020-10-26 ENCOUNTER — TRANSCRIBE ORDER (OUTPATIENT)
Dept: SCHEDULING | Age: 50
End: 2020-10-26

## 2020-10-26 DIAGNOSIS — Z12.31 VISIT FOR SCREENING MAMMOGRAM: Primary | ICD-10-CM

## 2021-01-04 ENCOUNTER — HOSPITAL ENCOUNTER (OUTPATIENT)
Dept: MAMMOGRAPHY | Age: 51
Discharge: HOME | End: 2021-01-04
Payer: COMMERCIAL

## 2021-01-04 DIAGNOSIS — Z12.31 VISIT FOR SCREENING MAMMOGRAM: ICD-10-CM

## 2021-01-04 PROCEDURE — 77063 BREAST TOMOSYNTHESIS BI: CPT

## 2021-10-27 ENCOUNTER — TRANSCRIBE ORDER (OUTPATIENT)
Dept: SCHEDULING | Age: 51
End: 2021-10-27

## 2021-10-27 DIAGNOSIS — Z12.31 VISIT FOR SCREENING MAMMOGRAM: Primary | ICD-10-CM

## 2022-01-11 ENCOUNTER — HOSPITAL ENCOUNTER (OUTPATIENT)
Dept: MAMMOGRAPHY | Age: 52
Discharge: HOME | End: 2022-01-11
Payer: COMMERCIAL

## 2022-01-11 DIAGNOSIS — Z12.31 VISIT FOR SCREENING MAMMOGRAM: ICD-10-CM

## 2022-01-11 PROCEDURE — 77067 SCR MAMMO BI INCL CAD: CPT

## 2022-12-17 ENCOUNTER — TRANSCRIBE ORDER (OUTPATIENT)
Dept: SCHEDULING | Age: 52
End: 2022-12-17

## 2022-12-17 DIAGNOSIS — Z13.820 ENCOUNTER FOR SCREENING FOR OSTEOPOROSIS: ICD-10-CM

## 2022-12-17 DIAGNOSIS — Z12.31 SCREENING MAMMOGRAM FOR BREAST CANCER: Primary | ICD-10-CM

## 2023-01-20 ENCOUNTER — HOSPITAL ENCOUNTER (OUTPATIENT)
Dept: BONE DENSITY | Age: 53
End: 2023-01-20
Attending: NURSE PRACTITIONER
Payer: COMMERCIAL

## 2023-01-20 ENCOUNTER — HOSPITAL ENCOUNTER (OUTPATIENT)
Dept: MAMMOGRAPHY | Age: 53
Discharge: HOME | End: 2023-01-20
Attending: NURSE PRACTITIONER
Payer: COMMERCIAL

## 2023-01-20 ENCOUNTER — HOSPITAL ENCOUNTER (OUTPATIENT)
Dept: BONE DENSITY | Age: 53
End: 2023-01-20
Payer: COMMERCIAL

## 2023-01-20 DIAGNOSIS — Z13.820 ENCOUNTER FOR SCREENING FOR OSTEOPOROSIS: ICD-10-CM

## 2023-01-20 DIAGNOSIS — Z12.31 SCREENING MAMMOGRAM FOR BREAST CANCER: ICD-10-CM

## 2023-01-20 PROCEDURE — 77067 SCR MAMMO BI INCL CAD: CPT

## 2023-01-20 PROCEDURE — 77080 DXA BONE DENSITY AXIAL: CPT

## 2024-06-12 ENCOUNTER — HOSPITAL ENCOUNTER (OUTPATIENT)
Facility: HOSPITAL | Age: 54
Discharge: HOME | End: 2024-06-15
Payer: MEDICAID

## 2024-06-12 VITALS — BODY MASS INDEX: 26.8 KG/M2 | WEIGHT: 157 LBS | HEIGHT: 64 IN

## 2024-06-12 DIAGNOSIS — Z01.419 ENCOUNTER FOR GYNECOLOGICAL EXAMINATION (GENERAL) (ROUTINE) WITHOUT ABNORMAL FINDINGS: ICD-10-CM

## 2024-06-12 PROCEDURE — 77063 BREAST TOMOSYNTHESIS BI: CPT
